# Patient Record
(demographics unavailable — no encounter records)

---

## 2024-11-11 NOTE — HISTORY OF PRESENT ILLNESS
[FreeTextEntry1] : Mr. Germain is a pleasant 51 year-old gentleman with a past medical history significant for obesity s/p gastric bypass surgery (2003), HTN, ETOH abuse (in recovery x 9 years, on Topamax) and symptomatic paroxysmal atrial fibrillation who presents for follow-up.  Now s/p CRYO PVI 1/4/24.  He denies any recurrent palpitations post procedure.   Continues to be an avid cyclist.  Stopped Eliquis given low CHADS score (1).  Inquiring about stopping beta blockers.  History includes:  He notes having intermittent palpitations for the last two years.  He was admitted to [facility] 7/2023 with palpitations with exertion and was found to be in rapid atrial fibrillation.  He was rate controlled and spontaneously converted to sinus rhythm.  He was aware of a prolonged episode of AF in November 2023 (~ 30 hours).  Notes awareness of palpitations.  Confirmed on [employer].    Patient reports a negative stress test in 9/2021.    He notes a history of BENJI treated with CPAP prior to his gastric bypass surgery in 2003.  No longer uses the machine and does not think he snores.  His starting weight prior to surgery 386 lb.    Since his AF diagnosis, he has cut out caffeinated coffee (previously drinking 4-5 espressos daily along with 2-3 cups of coffee).    Works as an .

## 2024-11-11 NOTE — DISCUSSION/SUMMARY
[FreeTextEntry1] : Mr. Germain is a pleasant 51 year-old gentleman with a past medical history significant for obesity s/p gastric bypass surgery (2003), HTN, ETOH abuse (in recovery x 9 years, on Topamax) and symptomatic paroxysmal atrial fibrillation s/p CRYO PVI 1/4/24.   1.  AF Maintaining sinus rhythm post procedure Continue Eliquis 5 mg BID for TE/CVA at least 90 days post procedure Would prefer he continue beta blocker for rate control but I am not opposed to him stopping   2.  Stroke Risk  CHADS 1 Off OAC  3.  Risk modification Reviewed heart healthy diet and resumption of routine aerobic exercise  F/U 9 months, sooner as needed

## 2024-11-11 NOTE — ADDENDUM
[FreeTextEntry1] : I, Kaylyn Cochran, am scribing for and the presence of Dr. Bear the following sections: HPI, PMH,Family/social history, ROS, Physical Exam, Assessment / Plan.   I, Jerel Bear, personally performed the services described in the documentation, reviewed the documentation recorded by the scribe in my presence and it accurately and completely records my words and actions.

## 2024-11-11 NOTE — HISTORY OF PRESENT ILLNESS
[FreeTextEntry1] : Mr. Germain is a pleasant 51 year-old gentleman with a past medical history significant for obesity s/p gastric bypass surgery (2003), HTN, ETOH abuse (in recovery x 9 years, on Topamax) and symptomatic paroxysmal atrial fibrillation who presents for follow-up.  Now s/p CRYO PVI 1/4/24.  He denies any recurrent palpitations post procedure.   Continues to be an avid cyclist.  Stopped Eliquis given low CHADS score (1).  Inquiring about stopping beta blockers.  History includes:  He notes having intermittent palpitations for the last two years.  He was admitted to [facility] 7/2023 with palpitations with exertion and was found to be in rapid atrial fibrillation.  He was rate controlled and spontaneously converted to sinus rhythm.  He was aware of a prolonged episode of AF in November 2023 (~ 30 hours).  Notes awareness of palpitations.  Confirmed on [employer].    Patient reports a negative stress test in 9/2021.    He notes a history of BENJI treated with CPAP prior to his gastric bypass surgery in 2003.  No longer uses the machine and does not think he snores.  His starting weight prior to surgery 386 lb.    Since his AF diagnosis, he has cut out caffeinated coffee (previously drinking 4-5 espressos daily along with 2-3 cups of coffee).    Works as an .

## 2024-11-11 NOTE — CARDIOLOGY SUMMARY
[de-identified] : 11/11/24 SB @ 52 bpm 2/5/24 SR @ 63 bpm  12/4/2023 SB @ 58 bpm  9/11/23 SB @ 50 bpm  [de-identified] : TTE 7/28/23: Normal left and right ventricular size and systolic function. Borderline dilated right atrium. No significant valvular disease. No evidence of pulmonary hypertension.  No pericardial effusion [de-identified] : ZioXT 10/7 - 10/16/24: SR (41-), non sustained AT, no AF  ZioXT 8/11 - 8/25/23 SR (37-), non sustained AT

## 2024-11-11 NOTE — CARDIOLOGY SUMMARY
[de-identified] : 11/11/24 SB @ 52 bpm 2/5/24 SR @ 63 bpm  12/4/2023 SB @ 58 bpm  9/11/23 SB @ 50 bpm  [de-identified] : ZioXT 10/7 - 10/16/24: SR (41-), non sustained AT, no AF  ZioXT 8/11 - 8/25/23 SR (37-), non sustained AT [de-identified] : TTE 7/28/23: Normal left and right ventricular size and systolic function. Borderline dilated right atrium. No significant valvular disease. No evidence of pulmonary hypertension.  No pericardial effusion

## 2024-11-11 NOTE — HISTORY OF PRESENT ILLNESS
[FreeTextEntry1] : Mr. Germain is a pleasant 51 year-old gentleman with a past medical history significant for obesity s/p gastric bypass surgery (2003), HTN, ETOH abuse (in recovery x 9 years, on Topamax) and symptomatic paroxysmal atrial fibrillation who presents for follow-up.  Now s/p CRYO PVI 1/4/24.  He denies any recurrent palpitations post procedure.   Continues to be an avid cyclist.  Stopped Eliquis given low CHADS score (1).  Inquiring about stopping beta blockers.  History includes:  He notes having intermittent palpitations for the last two years.  He was admitted to [facility] 7/2023 with palpitations with exertion and was found to be in rapid atrial fibrillation.  He was rate controlled and spontaneously converted to sinus rhythm.  He was aware of a prolonged episode of AF in November 2023 (~ 30 hours).  Notes awareness of palpitations.  Confirmed on [employer].    Patient reports a negative stress test in 9/2021.    He notes a history of BENJI treated with CPAP prior to his gastric bypass surgery in 2003.  No longer uses the machine and does not think he snores.  His starting weight prior to surgery 386 lb.    Since his AF diagnosis, he has cut out caffeinated coffee (previously drinking 4-5 espressos daily along with 2-3 cups of coffee).    Works as an .

## 2024-11-11 NOTE — CARDIOLOGY SUMMARY
[de-identified] : 11/11/24 SB @ 52 bpm 2/5/24 SR @ 63 bpm  12/4/2023 SB @ 58 bpm  9/11/23 SB @ 50 bpm  [de-identified] : TTE 7/28/23: Normal left and right ventricular size and systolic function. Borderline dilated right atrium. No significant valvular disease. No evidence of pulmonary hypertension.  No pericardial effusion [de-identified] : ZioXT 10/7 - 10/16/24: SR (41-), non sustained AT, no AF  ZioXT 8/11 - 8/25/23 SR (37-), non sustained AT